# Patient Record
Sex: FEMALE | Race: AMERICAN INDIAN OR ALASKA NATIVE
[De-identification: names, ages, dates, MRNs, and addresses within clinical notes are randomized per-mention and may not be internally consistent; named-entity substitution may affect disease eponyms.]

---

## 2022-06-20 ENCOUNTER — HOSPITAL ENCOUNTER (EMERGENCY)
Dept: HOSPITAL 5 - ED | Age: 47
Discharge: HOME | End: 2022-06-20
Payer: SELF-PAY

## 2022-06-20 VITALS — DIASTOLIC BLOOD PRESSURE: 88 MMHG | SYSTOLIC BLOOD PRESSURE: 147 MMHG

## 2022-06-20 DIAGNOSIS — M54.2: ICD-10-CM

## 2022-06-20 DIAGNOSIS — M54.6: ICD-10-CM

## 2022-06-20 DIAGNOSIS — M62.838: Primary | ICD-10-CM

## 2022-06-20 DIAGNOSIS — M62.830: ICD-10-CM
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
## 2022-06-20 DIAGNOSIS — Z79.899: ICD-10-CM

## 2022-06-20 PROCEDURE — 99284 EMERGENCY DEPT VISIT MOD MDM: CPT

## 2022-06-20 PROCEDURE — 72125 CT NECK SPINE W/O DYE: CPT

## 2022-06-20 PROCEDURE — 70450 CT HEAD/BRAIN W/O DYE: CPT
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
